# Patient Record
Sex: FEMALE | URBAN - METROPOLITAN AREA
[De-identification: names, ages, dates, MRNs, and addresses within clinical notes are randomized per-mention and may not be internally consistent; named-entity substitution may affect disease eponyms.]

---

## 2024-03-27 ENCOUNTER — APPOINTMENT (RX ONLY)
Dept: URBAN - METROPOLITAN AREA CLINIC 28 | Facility: CLINIC | Age: 79
Setting detail: DERMATOLOGY
End: 2024-03-27

## 2024-03-27 DIAGNOSIS — L20.89 OTHER ATOPIC DERMATITIS: ICD-10-CM | Status: WORSENING

## 2024-03-27 DIAGNOSIS — D17 BENIGN LIPOMATOUS NEOPLASM: ICD-10-CM | Status: RESOLVING

## 2024-03-27 DIAGNOSIS — L57.0 ACTINIC KERATOSIS: ICD-10-CM

## 2024-03-27 PROBLEM — L30.9 DERMATITIS, UNSPECIFIED: Status: ACTIVE | Noted: 2024-03-27

## 2024-03-27 PROBLEM — D48.5 NEOPLASM OF UNCERTAIN BEHAVIOR OF SKIN: Status: ACTIVE | Noted: 2024-03-27

## 2024-03-27 PROCEDURE — ? COUNSELING

## 2024-03-27 PROCEDURE — ? LIQUID NITROGEN

## 2024-03-27 PROCEDURE — ? TREATMENT REGIMEN

## 2024-03-27 PROCEDURE — 17000 DESTRUCT PREMALG LESION: CPT

## 2024-03-27 PROCEDURE — ? PRESCRIPTION MEDICATION MANAGEMENT

## 2024-03-27 PROCEDURE — 99214 OFFICE O/P EST MOD 30 MIN: CPT | Mod: 25

## 2024-03-27 ASSESSMENT — LOCATION ZONE DERM
LOCATION ZONE: LEG
LOCATION ZONE: TRUNK
LOCATION ZONE: ARM

## 2024-03-27 ASSESSMENT — LOCATION SIMPLE DESCRIPTION DERM
LOCATION SIMPLE: CHEST
LOCATION SIMPLE: RIGHT CALF
LOCATION SIMPLE: LEFT FOREARM
LOCATION SIMPLE: LEFT PRETIBIAL REGION

## 2024-03-27 ASSESSMENT — LOCATION DETAILED DESCRIPTION DERM
LOCATION DETAILED: RIGHT DISTAL CALF
LOCATION DETAILED: RIGHT LATERAL SUPERIOR CHEST
LOCATION DETAILED: LEFT MEDIAL PROXIMAL PRETIBIAL REGION
LOCATION DETAILED: LEFT PROXIMAL DORSAL FOREARM

## 2024-03-27 NOTE — HPI: RASH
Is This A New Presentation, Or A Follow-Up?: Rash
Additional History: Pt complains of rash that she is treating with betamethasone and hydrocortisone. Patient was told that she has eczema.

## 2024-03-27 NOTE — PROCEDURE: PRESCRIPTION MEDICATION MANAGEMENT
Detail Level: Zone
Continue Regimen: betamethasone ointment (outside Rx), discussed its potency and safest PRN use, dc asap
Render In Strict Bullet Format?: No

## 2024-03-27 NOTE — PROCEDURE: TREATMENT REGIMEN
Plan: Stable. Pt will sign MONAE to obtain ultrasound results. Assured lesion does not appear infectious now, but since it is greatly improved with the Doxycycline Rx started at Urgent care, and I did not see this lesion before tx was started, she should continue the course of antibiotics started at urgent care. Return to show me if it flares again or doesn't clear all the way away. She plans to get a copy of the [facility] report for her chart here.
Detail Level: Zone
Plan: Treated with LN2. If lesion does not heal, will perform biopsy.

## 2024-03-27 NOTE — HPI: SKIN LESION
Is This A New Presentation, Or A Follow-Up?: Skin Lesion
Additional History: Pt complains of scaly lesion on her left forearm that will not heal.
Additional History: Pt complains of firm, tender lesion on the back of her right shin. Pt states she got an U/S at [facility] and was told she has an infection. 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 Pt was prescribed oral antibiotic (doxycycline) and the lesion is getting better.

## 2024-04-17 ENCOUNTER — APPOINTMENT (RX ONLY)
Dept: URBAN - METROPOLITAN AREA CLINIC 28 | Facility: CLINIC | Age: 79
Setting detail: DERMATOLOGY
End: 2024-04-17

## 2024-04-17 DIAGNOSIS — D485 NEOPLASM OF UNCERTAIN BEHAVIOR OF SKIN: ICD-10-CM | Status: WORSENING

## 2024-04-17 DIAGNOSIS — L20.89 OTHER ATOPIC DERMATITIS: ICD-10-CM | Status: INADEQUATELY CONTROLLED

## 2024-04-17 PROBLEM — D48.5 NEOPLASM OF UNCERTAIN BEHAVIOR OF SKIN: Status: ACTIVE | Noted: 2024-04-17

## 2024-04-17 PROCEDURE — ? TREATMENT REGIMEN

## 2024-04-17 PROCEDURE — ? ADDITIONAL NOTES

## 2024-04-17 PROCEDURE — ? OBSERVATION AND MEASURE

## 2024-04-17 PROCEDURE — 99214 OFFICE O/P EST MOD 30 MIN: CPT

## 2024-04-17 PROCEDURE — ? COUNSELING

## 2024-04-17 ASSESSMENT — LOCATION SIMPLE DESCRIPTION DERM
LOCATION SIMPLE: LEFT ANTERIOR NECK
LOCATION SIMPLE: RIGHT CALF

## 2024-04-17 ASSESSMENT — LOCATION DETAILED DESCRIPTION DERM
LOCATION DETAILED: RIGHT DISTAL CALF
LOCATION DETAILED: LEFT INFERIOR LATERAL NECK

## 2024-04-17 ASSESSMENT — LOCATION ZONE DERM
LOCATION ZONE: LEG
LOCATION ZONE: NECK

## 2024-04-17 NOTE — PROCEDURE: TREATMENT REGIMEN
Detail Level: Zone
Initiate Treatment: Pt has Betamethasone ointment. Use twice daily for up to two weeks on affected area.\\nDiscussed potency of betamethasone, so only for short term use.
Otc Regimen: gentle cleansers like Dove

## 2024-04-17 NOTE — HPI: OTHER
Condition:: Eczema
Please Describe Your Condition:: The patient has an itchy rash on the side of the left neck that is a recent flare. She has betamethasone ointment at home Rx'd by another doctor for a rash she had on her abdomen. She'd like to try that on this rash.

## 2024-04-17 NOTE — PROCEDURE: ADDITIONAL NOTES
Additional Notes: Will refer pt to Dr Ovalle for further evaluation.\\n\\nWhen she was here just 2-3 weeks ago, the knot was difficult to even find/palpate. Now very easily palpated, larger. Differential diagnosis reviewed. NO signs of infection. Feels most c/w lipoma, but there are some kinds of cancerous soft tissue growths that could be possible. Discussed in detail the risks and benefits of deep biopsy or excision or CT scan eval first (patient asked about CT) vs observation. She would like to be aggressive and wants pathology reassurance. I will refer her to a general surgeon in our building complex to consider next steps in the work up and treatment. The patient agrees.
Detail Level: Simple
Render Risk Assessment In Note?: no

## 2024-05-28 ENCOUNTER — APPOINTMENT (RX ONLY)
Dept: URBAN - METROPOLITAN AREA CLINIC 28 | Facility: CLINIC | Age: 79
Setting detail: DERMATOLOGY
End: 2024-05-28

## 2024-05-28 DIAGNOSIS — D485 NEOPLASM OF UNCERTAIN BEHAVIOR OF SKIN: ICD-10-CM

## 2024-05-28 DIAGNOSIS — L72.0 EPIDERMAL CYST: ICD-10-CM

## 2024-05-28 DIAGNOSIS — L57.0 ACTINIC KERATOSIS: ICD-10-CM

## 2024-05-28 DIAGNOSIS — L20.89 OTHER ATOPIC DERMATITIS: ICD-10-CM | Status: STABLE

## 2024-05-28 PROBLEM — D48.5 NEOPLASM OF UNCERTAIN BEHAVIOR OF SKIN: Status: ACTIVE | Noted: 2024-05-28

## 2024-05-28 PROCEDURE — ? COUNSELING

## 2024-05-28 PROCEDURE — 99213 OFFICE O/P EST LOW 20 MIN: CPT

## 2024-05-28 PROCEDURE — ? ADDITIONAL NOTES

## 2024-05-28 PROCEDURE — ? OBSERVATION AND MEASURE

## 2024-05-28 ASSESSMENT — LOCATION DETAILED DESCRIPTION DERM
LOCATION DETAILED: LEFT INFERIOR LATERAL NECK
LOCATION DETAILED: LEFT CLAVICULAR NECK
LOCATION DETAILED: RIGHT DISTAL CALF
LOCATION DETAILED: LEFT PROXIMAL DORSAL FOREARM

## 2024-05-28 ASSESSMENT — LOCATION SIMPLE DESCRIPTION DERM
LOCATION SIMPLE: LEFT ANTERIOR NECK
LOCATION SIMPLE: LEFT FOREARM
LOCATION SIMPLE: RIGHT CALF

## 2024-05-28 ASSESSMENT — LOCATION ZONE DERM
LOCATION ZONE: LEG
LOCATION ZONE: NECK
LOCATION ZONE: ARM

## 2024-05-28 NOTE — PROCEDURE: OBSERVATION
Detail Level: Detailed
Size Of Lesion: [id number]
Morphology Per Location (Optional): Very pale stuck on

## 2024-05-28 NOTE — HPI: OTHER
Condition:: Follow up
Please Describe Your Condition:: is an established patient who is being seen for a chief complaint of Follow up of a left calf subcutaneous lump. We recommended she see a general surgeon, Dr. Ovalle, but she did not see him. Her PCP instead sent her to Dr. Arvin Taylor at [facility] orthopedic sports medicine and he did a biopsy of the spot on the right calf. The patient says the preliminary report was benign and showed no cancer; but she has a telemedicine appointment with him this afternoon to get the final results.
Condition:: Actinic keratosis
Please Describe Your Condition:: is an established patient who is being seen for a chief complaint of Actinic keratosis. She is here for a follow up for actinic keratosis, Dr. Henson froze it on her left forearm at her March appointment and said if it’s not gone then we might need to do a biopsy. \\n\\nShe said there is still a tiny scaly spot there, but thinks it might need to be froze again.
Condition:: Rash
Please Describe Your Condition:: is an established patient who is being seen for a chief complaint of Rash. Patient reports that she got an itchy rash on her left neck and she applied the betamethasone ointment on it. But she thinks it’s too strong because she said her neck got really red. \\nShe said the rash is all gone except for one spot, so she thinks she might need something not as strong. \\n\\nShe uses Betamethasone for a rash in her belly crease along an old scar line. The rash flares there badly at times and her PCP sees her for it and said maybe it is a rash/allergy to an old mesh below the skin used in her surgical repair. But there is no rash present now. When it occurs, the betamethasone does clear it up.

## 2024-05-28 NOTE — PROCEDURE: ADDITIONAL NOTES
Render Risk Assessment In Note?: no
Detail Level: Simple
Additional Notes: We recommended a consult with Dr. Ovalle for possible biopsy of the SQ nodule. \\n\\nPatient saw her internist and they recommended her to go see Dr. Arvin Stoner (orthopedist)  instead, and he did a biopsy. \\nThe preliminary results were non cancerous; but she will have a telemedicine appointment with him this afternoon.\\n\\nNodule ls still palpable
Additional Notes: Status post cryo therapy in 03/2024 \\n\\nNo suspicious lesion seen today; resolved.

## 2024-05-28 NOTE — PROCEDURE: COUNSELING
Detail Level: Detailed
Patient Specific Counseling (Will Not Stick From Patient To Patient): -\\n\\nMost consistent with eczema per the patient history, on the side of her neck; but rash is only very mild  today. \\nRecommended gentle skin care and good moisturizers. \\nDiscussed that we could give her a medium strength topical steroid prescription to use when a rash pops up but it is only to be used when and where you need it. \\n\\n\\n\\nABDOMEN HISTORY OF RASH:\\nReviewed we can’t prescribe the Betamethasone ointment for a rash we have not seen on the abdomen, patient reports using it because she gets a rash from the mesh that’s under the skin. \\nReviewed Dr. Henson doesn’t feel comfortable writing for such a strong steroid, when there is no rash present, and a coming and going rash in the panus crease/over her scar (for years) doesn't sound like a mesh allergy. \\nShe can go to her internist for refills, if she'd like,  since she was the one who prescribed it and has seen the rash. Or she can follow up here the next time the rash occurs, so I can see it and we can decide if testing needs to be done or if betamethasone is needed.

## 2025-01-08 ENCOUNTER — APPOINTMENT (OUTPATIENT)
Dept: URBAN - METROPOLITAN AREA CLINIC 28 | Facility: CLINIC | Age: 80
Setting detail: DERMATOLOGY
End: 2025-01-08

## 2025-01-08 DIAGNOSIS — L65.8 OTHER SPECIFIED NONSCARRING HAIR LOSS: ICD-10-CM

## 2025-01-08 DIAGNOSIS — L81.4 OTHER MELANIN HYPERPIGMENTATION: ICD-10-CM

## 2025-01-08 DIAGNOSIS — Z85.828 PERSONAL HISTORY OF OTHER MALIGNANT NEOPLASM OF SKIN: ICD-10-CM

## 2025-01-08 DIAGNOSIS — D22 MELANOCYTIC NEVI: ICD-10-CM

## 2025-01-08 DIAGNOSIS — Z12.83 ENCOUNTER FOR SCREENING FOR MALIGNANT NEOPLASM OF SKIN: ICD-10-CM

## 2025-01-08 DIAGNOSIS — L72.8 OTHER FOLLICULAR CYSTS OF THE SKIN AND SUBCUTANEOUS TISSUE: ICD-10-CM | Status: STABLE

## 2025-01-08 DIAGNOSIS — L82.1 OTHER SEBORRHEIC KERATOSIS: ICD-10-CM

## 2025-01-08 PROBLEM — D22.5 MELANOCYTIC NEVI OF TRUNK: Status: ACTIVE | Noted: 2025-01-08

## 2025-01-08 PROCEDURE — 99213 OFFICE O/P EST LOW 20 MIN: CPT

## 2025-01-08 PROCEDURE — ? COUNSELING

## 2025-01-08 ASSESSMENT — LOCATION DETAILED DESCRIPTION DERM
LOCATION DETAILED: POSTERIOR MID-PARIETAL SCALP
LOCATION DETAILED: RIGHT INFERIOR MEDIAL MIDBACK
LOCATION DETAILED: LEFT MEDIAL UPPER BACK
LOCATION DETAILED: RIGHT LABIUM MAJUS
LOCATION DETAILED: RIGHT INFERIOR MEDIAL UPPER BACK
LOCATION DETAILED: MID-FRONTAL SCALP

## 2025-01-08 ASSESSMENT — LOCATION SIMPLE DESCRIPTION DERM
LOCATION SIMPLE: RIGHT UPPER BACK
LOCATION SIMPLE: LEFT UPPER BACK
LOCATION SIMPLE: ANTERIOR SCALP
LOCATION SIMPLE: LABIA MAJORA
LOCATION SIMPLE: RIGHT LOWER BACK
LOCATION SIMPLE: POSTERIOR SCALP

## 2025-01-08 ASSESSMENT — LOCATION ZONE DERM
LOCATION ZONE: TRUNK
LOCATION ZONE: VULVA
LOCATION ZONE: SCALP

## 2025-01-08 NOTE — HPI: OTHER
Condition:: FPHL
Please Describe Your Condition:: The patient's PCP has been prescribing a pill for her hair loss. She isn't sure of the name of it. She might want us to take over the Rx someday, as her PCP is moving to  medicine, but Piotr will be seeing her still for at least the next few months.
Condition:: SKs
Please Describe Your Condition:: The patient notices rough crusty tan spots on her chest and sometimes other places and wants to be sure they all look okay. No  unusual one.
Condition:: Genital cyst
Please Describe Your Condition:: The patient can feel a bump on the right genital area. It is old and no symptoms, but she wishes it wasn't there. Does not seem to be changing.

## 2025-01-08 NOTE — PROCEDURE: COUNSELING
Skin Checks Recommendations: SSE q month\\nFBSE with derm q year
Detail Level: Generalized
Detail Level: Detailed
Patient Specific Counseling (Will Not Stick From Patient To Patient): --\\n\\nthe patient agrees with observation. Report changes or symptoms.
Patient Specific Counseling (Will Not Stick From Patient To Patient): --\\n\\nthe patient's PCP is currently treating this with unknown Rx. The patient will let us know at some point if she wants to talk more about this with us or have us take over Rxs. She reports that her hair loss has been stable, though she knows she gets increased shedding at times, like recently with Covid 19 infection and could happen after upcoming knee surgery. 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

## 2025-06-19 ENCOUNTER — OFFICE VISIT (OUTPATIENT)
Dept: URBAN - METROPOLITAN AREA CLINIC 12 | Facility: CLINIC | Age: 80
End: 2025-06-19

## 2025-06-23 ENCOUNTER — OFFICE VISIT (OUTPATIENT)
Dept: URBAN - METROPOLITAN AREA CLINIC 12 | Facility: CLINIC | Age: 80
End: 2025-06-23
Payer: MEDICARE

## 2025-06-23 ENCOUNTER — DASHBOARD ENCOUNTERS (OUTPATIENT)
Age: 80
End: 2025-06-23

## 2025-06-23 DIAGNOSIS — Z86.0100 HISTORY OF COLON POLYPS: ICD-10-CM

## 2025-06-23 DIAGNOSIS — K62.5 RECTAL BLEEDING: ICD-10-CM

## 2025-06-23 DIAGNOSIS — Q43.8 TORTUOUS COLON: ICD-10-CM

## 2025-06-23 DIAGNOSIS — K64.8 OTHER HEMORRHOIDS: ICD-10-CM

## 2025-06-23 DIAGNOSIS — Z90.49 HISTORY OF PARTIAL COLECTOMY: ICD-10-CM

## 2025-06-23 DIAGNOSIS — R19.4 CHANGE IN BOWEL HABITS: ICD-10-CM

## 2025-06-23 DIAGNOSIS — R15.9 FREQUENT FECAL INCONTINENCE: ICD-10-CM

## 2025-06-23 DIAGNOSIS — Z98.890 HISTORY OF ABDOMINAL SURGERY: ICD-10-CM

## 2025-06-23 PROBLEM — 10331000132107: Status: ACTIVE | Noted: 2025-06-23

## 2025-06-23 PROBLEM — 460671000124103: Status: ACTIVE | Noted: 2025-06-23

## 2025-06-23 PROBLEM — 88111009: Status: ACTIVE | Noted: 2025-06-23

## 2025-06-23 PROBLEM — 161615003: Status: ACTIVE | Noted: 2025-06-23

## 2025-06-23 PROBLEM — 428283002: Status: ACTIVE | Noted: 2025-06-23

## 2025-06-23 PROBLEM — 428305005: Status: ACTIVE | Noted: 2025-06-23

## 2025-06-23 PROCEDURE — 99204 OFFICE O/P NEW MOD 45 MIN: CPT

## 2025-06-23 RX ORDER — SODIUM, POTASSIUM,MAG SULFATES 17.5-3.13G
THE 1ST DOSE IN THE EVENING BEFORE PROCEDURE AND 2ND DOSE 5-8HOURS BEFORE THE PROCEDURE SOLUTION, RECONSTITUTED, ORAL ORAL AS DIRECTED
Qty: 1 KIT | Refills: 0 | OUTPATIENT
Start: 2025-06-23 | End: 2025-06-24

## 2025-06-23 NOTE — HPI-TODAY'S VISIT:
Patient is a 79-year-old female who presents today for a colonoscopy consultation. She reports episodes of fecal incontinence with total loss of bowel control and accidents associated with watery diarrhea. She also notes that every time she urinates, she has a BM. She reports her stools are usually formed, sometimes small in caliber. She also endorses intermittent rectal bleeding. Denies persistent diarrhea.  Her last colonoscopy was reportedly 3 years ago and was negative for colon polyps, although she does have a history of colon polyps in the past.  She has a history of multiple abdominal surgeries, including abdominal hernia repair with mesh placement complicated by MRSA infection requiring wound VAC. She also has a history of vaginal prolapse repair and a partial colon resection for diverticulitis approximately 10-15 years ago. She reports a history of difficult colonoscopies due to tortuous colon and states that a pediatric scope was previously required. Denies family history of CRC.  She is followed by her cardiologist, Dr. Soto, and recently completed a full cardiac workup. She denies any history of pulmonary or renal disease. No prior issues with anesthesia.

## 2025-06-30 ENCOUNTER — TELEPHONE ENCOUNTER (OUTPATIENT)
Dept: URBAN - METROPOLITAN AREA CLINIC 12 | Facility: CLINIC | Age: 80
End: 2025-06-30

## 2025-07-11 ENCOUNTER — OFFICE VISIT (OUTPATIENT)
Dept: URBAN - METROPOLITAN AREA MEDICAL CENTER 27 | Facility: MEDICAL CENTER | Age: 80
End: 2025-07-11
Payer: MEDICARE

## 2025-07-11 DIAGNOSIS — R19.4 CHANGE IN BOWEL HABIT: ICD-10-CM

## 2025-07-11 DIAGNOSIS — D12.2 ADENOMA OF ASCENDING COLON: ICD-10-CM

## 2025-07-11 PROCEDURE — 45385 COLONOSCOPY W/LESION REMOVAL: CPT | Performed by: STUDENT IN AN ORGANIZED HEALTH CARE EDUCATION/TRAINING PROGRAM
